# Patient Record
Sex: MALE | Race: WHITE | HISPANIC OR LATINO | Employment: UNEMPLOYED | URBAN - METROPOLITAN AREA
[De-identification: names, ages, dates, MRNs, and addresses within clinical notes are randomized per-mention and may not be internally consistent; named-entity substitution may affect disease eponyms.]

---

## 2024-10-14 ENCOUNTER — HOSPITAL ENCOUNTER (EMERGENCY)
Facility: HOSPITAL | Age: 34
Discharge: HOME | End: 2024-10-14
Attending: EMERGENCY MEDICINE | Admitting: EMERGENCY MEDICINE

## 2024-10-14 ENCOUNTER — APPOINTMENT (OUTPATIENT)
Dept: GENERAL RADIOLOGY | Facility: HOSPITAL | Age: 34
End: 2024-10-14

## 2024-10-14 ENCOUNTER — APPOINTMENT (OUTPATIENT)
Dept: CT IMAGING | Facility: HOSPITAL | Age: 34
End: 2024-10-14

## 2024-10-14 VITALS
RESPIRATION RATE: 18 BRPM | TEMPERATURE: 97.5 F | HEART RATE: 54 BPM | HEIGHT: 71 IN | BODY MASS INDEX: 29.12 KG/M2 | DIASTOLIC BLOOD PRESSURE: 78 MMHG | SYSTOLIC BLOOD PRESSURE: 138 MMHG | OXYGEN SATURATION: 98 % | WEIGHT: 208 LBS

## 2024-10-14 DIAGNOSIS — S92.101A CLOSED NONDISPLACED FRACTURE OF RIGHT TALUS, UNSPECIFIED PORTION OF TALUS, INITIAL ENCOUNTER: Primary | ICD-10-CM

## 2024-10-14 PROCEDURE — 73700 CT LOWER EXTREMITY W/O DYE: CPT

## 2024-10-14 PROCEDURE — 96372 THER/PROPH/DIAG INJ SC/IM: CPT

## 2024-10-14 PROCEDURE — 73610 X-RAY EXAM OF ANKLE: CPT

## 2024-10-14 PROCEDURE — 25010000002 KETOROLAC TROMETHAMINE PER 15 MG

## 2024-10-14 PROCEDURE — 99284 EMERGENCY DEPT VISIT MOD MDM: CPT

## 2024-10-14 RX ORDER — KETOROLAC TROMETHAMINE 30 MG/ML
30 INJECTION, SOLUTION INTRAMUSCULAR; INTRAVENOUS ONCE
Status: COMPLETED | OUTPATIENT
Start: 2024-10-14 | End: 2024-10-14

## 2024-10-14 RX ORDER — KETOROLAC TROMETHAMINE 10 MG/1
10 TABLET, FILM COATED ORAL EVERY 6 HOURS PRN
Qty: 16 TABLET | Refills: 0 | Status: SHIPPED | OUTPATIENT
Start: 2024-10-14 | End: 2024-10-18

## 2024-10-14 RX ADMIN — KETOROLAC TROMETHAMINE 30 MG: 30 INJECTION, SOLUTION INTRAMUSCULAR; INTRAVENOUS at 19:50

## 2024-10-14 NOTE — Clinical Note
[facility] EMERGENCY ROOM  [address]  [place] [address]  Phone: [phone]  Fax: [phone]    Kevin Greenberg was seen and treated in our emergency department on 10/14/2024.  He may return to work on 10/21/2024.         Thank you for choosing [facility].    Cole Arriaga PA-C

## 2024-10-15 NOTE — ED PROVIDER NOTES
"Time: 9:09 PM EDT  Date of encounter:  10/14/2024  Independent Historian/Clinical History and Information was obtained by:   Patient    History is limited by: N/A    Chief Complaint: Right ankle pain      History of Present Illness:  Patient is a 34 y.o. year old male who presents to the emergency department for evaluation of right ankle pain that began just prior to arrival when he fell approximately 4 to 6 feet off a ladder onto his right ankle into the grass today while cutting branches from a tree.  Patient denies hitting his head or losing consciousness.  Patient denies sensation issues to toes.  Patient states he has been able to ambulate but with discomfort to right ankle.      Patient Care Team  Primary Care Provider: Provider, No Known    Past Medical History:     No Known Allergies  History reviewed. No pertinent past medical history.  No past surgical history on file.  History reviewed. No pertinent family history.    Home Medications:  Prior to Admission medications    Not on File        Social History:          Review of Systems:  Review of Systems   Constitutional:  Negative for chills and fever.   HENT:  Negative for congestion, rhinorrhea and sore throat.    Eyes:  Negative for pain and visual disturbance.   Respiratory:  Negative for apnea, cough, chest tightness and shortness of breath.    Cardiovascular:  Negative for chest pain and palpitations.   Gastrointestinal:  Negative for abdominal pain, diarrhea, nausea and vomiting.   Genitourinary:  Negative for difficulty urinating and dysuria.   Musculoskeletal:  Positive for arthralgias and gait problem. Negative for joint swelling and myalgias.   Skin:  Negative for color change.   Neurological:  Negative for seizures and headaches.   Psychiatric/Behavioral: Negative.     All other systems reviewed and are negative.       Physical Exam:  /78   Pulse 54   Temp 97.5 °F (36.4 °C)   Resp 18   Ht 180.3 cm (71\")   Wt 94.3 kg (208 lb)   SpO2 " 98%   BMI 29.01 kg/m²     Physical Exam  Vitals and nursing note reviewed.   Constitutional:       General: He is not in acute distress.     Appearance: Normal appearance. He is not toxic-appearing.   HENT:      Head: Normocephalic and atraumatic.      Jaw: There is normal jaw occlusion.   Eyes:      General: Lids are normal.      Extraocular Movements: Extraocular movements intact.      Conjunctiva/sclera: Conjunctivae normal.      Pupils: Pupils are equal, round, and reactive to light.   Cardiovascular:      Rate and Rhythm: Normal rate and regular rhythm.      Pulses: Normal pulses.      Heart sounds: Normal heart sounds.   Pulmonary:      Effort: Pulmonary effort is normal. No respiratory distress.      Breath sounds: Normal breath sounds. No wheezing or rhonchi.   Abdominal:      General: Abdomen is flat.      Palpations: Abdomen is soft.      Tenderness: There is no abdominal tenderness. There is no guarding or rebound.   Musculoskeletal:         General: Tenderness (Appreciated upon palpation to right distal tib-fib/ankle) present.      Cervical back: Normal range of motion and neck supple.      Right lower leg: No edema.      Left lower leg: No edema.      Comments: Pedal pulse 2+.  Capillary refill less than 2 seconds.   Skin:     General: Skin is warm and dry.   Neurological:      Mental Status: He is alert and oriented to person, place, and time. Mental status is at baseline.   Psychiatric:         Mood and Affect: Mood normal.                  Procedures:  Procedures      Medical Decision Making:      Comorbidities that affect care:    None    External Notes reviewed:          The following orders were placed and all results were independently analyzed by me:  Orders Placed This Encounter   Procedures    [facility] 11.  Crutches; Prevents Accomplishing MRADLs; Able to Safely Use Equipment; Mobility Deficit Can Be Sufficiently Resolved By Use of Equipment    XR Ankle 3+ View Right    CT Lower  Extremity Right Without Contrast    Ambulatory Referral to Podiatry    Obtain & Apply The Following-       Medications Given in the Emergency Department:  Medications   ketorolac (TORADOL) injection 30 mg (30 mg Intramuscular Given 10/14/24 1950)        ED Course:         Labs:    Lab Results (last 24 hours)       ** No results found for the last 24 hours. **             Imaging:    CT Lower Extremity Right Without Contrast    Result Date: 10/14/2024  CT LOWER EXTREMITY RIGHT WO CONTRAST Date of Exam: 10/14/2024 8:15 PM EDT Indication: possible lateral malleolus fracture evaluate fracture. Comparison: Same day radiographs Technique: Axial CT images were obtained of the right lower extremity without contrast administration.  Reconstructed coronal and sagittal images were also obtained. Automated exposure control and iterative construction methods were used. Findings: There is a nondisplaced fracture of the posterior lateral talus extending to the posterior facet of the subtalar joint as seen on sagittal series 5 images 91 through 98. There is an additional small linear ossific fragment along the posterior medial aspect of the talus as seen on series 3 images 47 through 50, which could represent additional mildly displaced fracture. No other fracture is identified. The lateral malleolus appears intact. There is normal joint alignment. No significant degenerative changes. There is soft tissue edema about the ankle. No drainable or well-defined fluid collection. Probable peroneal tendinopathy..     Impression: Nondisplaced posterior lateral talus fracture with intra-articular extension to the posterior facet of the subtalar joint. Possible additional small avulsion fracture of the posterior medial aspect of the talus. Electronically Signed: Jeremy Menjivar MD  10/14/2024 8:35 PM EDT  Workstation ID: [id number]    XR Ankle 3+ View Right    Result Date: 10/14/2024  XR ANKLE 3+ VW RIGHT Date of Exam: 10/14/2024 7:19 PM EDT  Indication: fall Comparison: None available. Findings: There is a small age-indeterminate ossific fragment inferior to the lateral malleolus. Otherwise no evidence of fracture. Normal joint alignment. Soft tissue edema about the ankle.,     Impression: Small ossific fragment inferior to the lateral malleolus which may represent a small avulsion fracture. No evidence of additional fracture or dislocation. Electronically Signed: Jeremy Menjivar MD  10/14/2024 7:32 PM EDT  Workstation ID: [id number]       Differential Diagnosis and Discussion:    Extremity Pain: Differential diagnosis includes but is not limited to soft tissue sprain, tendonitis, tendon injury, dislocation, fracture, deep vein thrombosis, arterial insufficiency, osteoarthritis, bursitis, and ligamentous damage.    All X-rays impressions were independently interpreted by me.  CT scan radiology impression was interpreted by me.    MDM     CT lower extremity without contrast shows Nondisplaced posterior lateral talus fracture with intra-articular extension to the posterior facet of the subtalar joint. Possible additional small avulsion fracture of the posterior medial aspect of the talus.  I spoke with podiatrist Dr. Smith who states to place patient in a posterior leg splint, put patient on crutches, and he will call the patient this week to schedule a follow-up appointment.  I will send patient home with Toradol as he states that helped in the ED.  I instructed patient to return to ED if he develops any new or worsening symptoms.  Patient states he understands and agrees with plan of care.                Patient Care Considerations:          Consultants/Shared Management Plan:    I spoke with podiatrist Dr. Smith who states to place patient in a posterior leg splint, put patient on crutches, and he will call the patient this week to schedule a follow-up appointment    Social Determinants of Health:          Disposition and Care  Coordination:    Discharged: The patient is suitable and stable for discharge with no need for consideration of admission.    I have explained the patient´s condition, diagnoses and treatment plan based on the information available to me at this time. I have answered questions and addressed any concerns. The patient has a good  understanding of the patient´s diagnosis, condition, and treatment plan as can be expected at this point. The vital signs have been stable. The patient´s condition is stable and appropriate for discharge from the emergency department.      The patient will pursue further outpatient evaluation with the primary care physician or other designated or consulting physician as outlined in the discharge instructions. They are agreeable to this plan of care and follow-up instructions have been explained in detail. The patient has received these instructions in written format and has expressed an understanding of the discharge instructions. The patient is aware that any significant change in condition or worsening of symptoms should prompt an immediate return to this or the closest emergency department or call to 911.  I have explained discharge medications and the need for follow up with the patient/caretakers. This was also printed in the discharge instructions. Patient was discharged with the following medications and follow up:      Medication List        New Prescriptions      ketorolac 10 MG tablet  Commonly known as: TORADOL  Take 1 tablet by mouth Every 6 (Six) Hours As Needed for Moderate Pain for up to 4 days.               Where to Get Your Medications        These medications were sent to [facility], [place] - [place] - [address] - [phone]  - [phone] FX  [address], [facility] [address]      Phone: [phone]   ketorolac 10 MG tablet      Bryce Smith, SHAD  [address]  [facility]  [address]  [phone]    Call in 1 day  To schedule follow-up       Final diagnoses:   Closed nondisplaced fracture of right talus, unspecified portion of talus, initial encounter        ED Disposition       ED Disposition   Discharge    Condition   Stable    Comment   --               This medical record created using voice recognition software.             Cole Arriaga PA-C  10/14/24 2261